# Patient Record
(demographics unavailable — no encounter records)

---

## 2017-07-28 NOTE — RADRPT
PROCEDURE:   XR Chest. 

 

CLINICAL INDICATION:   Abdominal pain.

 

TECHNIQUE:   Single AP portable chest.

 

COMPARISON:   None. 

 

FINDINGS:

 

The cardiac silhouette is mildly enlarged.   The lungs are clear without  pleural effusion or focal 
consolidation. No pneumothorax. The osseous structures and soft tissues are unremarkable. 

 

IMPRESSION:

 

1. No evidence for active cardiopulmonary disease.

 

RPTAT:AAJJ

_____________________________________________ 

MARCIANO Cannon Physician           Date    Time 

Electronically viewed and signed by MARCIANO Cannon Physician on 07/28/2017 01:26 

 

D:  07/28/2017 01:26  T:  07/28/2017 01:26

DOROTA/

## 2017-07-28 NOTE — ERD
ER Documentation


Chief Complaint


Date/Time


DATE: 7/28/17 


TIME: 02:05


Chief Complaint


CWP x 1 week, radiating to left shoulder and back; pt not in distress





HPI


20-year-old man complains of sharp left-sided chest pain radiating to the left 

shoulder and back similar to multiple previous episodes of pericarditis.  He 

states he has had 4 previous pericarditis exacerbations over the last couple of 

years.  He states he uses ibuprofen daily.  He states the pain is nonexertional

, sharp, not associated with shortness of breath, dizziness, diaphoresis, or 

dizziness.  He denies trauma, no fevers or chills, no cough, no headache or 

blurry vision, no calf or leg swelling.





ROS


All systems reviewed and are negative except as per history of present illness.





Medications


Home Meds


Active Scripts


Tramadol HCl (Tramadol HCl) 50 Mg Tablet, 50 MG PO TID for PAIN AND/OR 

INFLAMMATION, #15 TAB


   Prov:MATY DEE MD         7/28/17


Ibuprofen* (Ibuprofen*) 600 Mg Tablet, 600 MG PO Q8 for PAIN AND/OR INFLAMMATION

, #30 TAB


   Prov:MATY DEE MD         7/28/17





Allergies


Allergies:  


Uncoded Allergies:  


     LOBSTER (Allergy, Mild, 7/28/17)





PMhx/Soc


Pericarditis


Medical and Surgical Hx:  pt denies Surgical Hx


Hx Miscellaneous Medical Probl:  Yes (Peracarditis )


Hx Alcohol Use:  No


Hx Substance Use:  No


Hx Tobacco Use:  No


Smoking Status:  Never smoker





FmHx


Family History:  diabetes





Physical Exam


Vitals





Vital Signs








  Date Time  Temp Pulse Resp B/P Pulse Ox O2 Delivery O2 Flow Rate FiO2


 


7/28/17 03:07 98.6 89 14 112/69 100 Room Air  


 


7/28/17 02:30 98.3 75 18 120/68 100 Room Air  


 


7/28/17 00:22 98.0 81 20 132/75 100   








Physical Exam


GENERAL: Well-developed, well-nourished, well-hydrated, in no apparent distress

, looks nontoxic in appearance


HEENT: Moist mucous membranes, pink conjunctiva, no cervical spine tenderness 

or step-off deformities, no goiter, no jaundice or icterus, extraocular 

movements intact without pain. No submandibular induration, and no pharyngeal 

erythema


NEURO: Alert and oriented 3, cranial nerves II through XII intact bilaterally, 

pupils equal round reactive to light, no focal deficits or facial asymmetry, 

sensation intact distally Strength 5/5 in upper and lower extremities 

bilaterally


CARDIAC: Regular rate and rhythm, no murmurs rubs or gallops


LUNGS: Clear bilaterally no wheezing crackles or stridor


ABDOMEN: Soft nontender, no guarding, no rigidity, no rebound, no psoas sign no 

obturator sign. Normoactive bowel sounds


SKIN: Warm and dry to touch, no abrasions, contusions, or hematomas, no 

lacerations, no ecchymosis, no target lesions, and without ulcers


EXTREMITIES: No clubbing cyanosis or edema, calves are bilaterally symmetrical, 

no Homans sign, no popliteal cord sign. Distal pulses equal and bilateral


PSYCH: Normal affect without agitation or irritability


Result Diagram:  


7/28/17 0125 7/28/17 0125





Results 24 hrs





 Laboratory Tests








Test


  7/28/17


01:25


 


White Blood Count 7.010^3/ul 


 


Red Blood Count 4.9810^6/ul 


 


Hemoglobin 13.6g/dl 


 


Hematocrit 40.7% 


 


Mean Corpuscular Volume 81.7fl 


 


Mean Corpuscular Hemoglobin 27.3pg 


 


Mean Corpuscular Hemoglobin


Concent 33.4g/dl 


 


 


Red Cell Distribution Width 12.6% 


 


Platelet Count 88795^3/UL 


 


Mean Platelet Volume 9.3fl 


 


Neutrophils % 56.9% 


 


Lymphocytes % 34.2% 


 


Monocytes % 6.6% 


 


Eosinophils % 1.1% 


 


Basophils % 0.9% 


 


Nucleated Red Blood Cells % 0.0/100WBC 


 


Neutrophils # 4.010^3/ul 


 


Lymphocytes # 2.410^3/ul 


 


Monocytes # 0.510^3/ul 


 


Eosinophils # 0.110^3/ul 


 


Basophils # 0.110^3/ul 


 


Nucleated Red Blood Cells # 0.010^3/ul 


 


Sodium Level 142mmol/L 


 


Potassium Level 3.8mmol/L 


 


Chloride Level 102mmol/L 


 


Carbon Dioxide Level 24mmol/L 


 


Anion Gap 20 


 


Blood Urea Nitrogen 17mg/dl 


 


Creatinine 0.86mg/dl 


 


Glucose Level 100mg/dl 


 


Calcium Level 9.4mg/dl 


 


Total Bilirubin 0.3mg/dl 


 


Direct Bilirubin 0.00mg/dl 


 


Indirect Bilirubin 0.3mg/dl 


 


Aspartate Amino Transf


(AST/SGOT) 27IU/L 


 


 


Alanine Aminotransferase


(ALT/SGPT) 39IU/L 


 


 


Alkaline Phosphatase 104IU/L 


 


Troponin I < 0.012ng/ml 


 


Total Protein 7.7g/dl 


 


Albumin 4.5g/dl 


 


Globulin 3.20g/dl 


 


Albumin/Globulin Ratio 1.40 


 


Lipase 24U/L 








 Current Medications








 Medications


  (Trade)  Dose


 Ordered  Sig/Soy


 Route


 PRN Reason  Start Time


 Stop Time Status Last Admin


Dose Admin


 


 Oxycodone/


 Acetaminophen


  (Percocet (5/


 325))  1 tab  ONCE  ONCE


 PO


   7/28/17 01:30


 7/28/17 01:31 DC 7/28/17 01:14


 











Procedures/MDM


I administered Percocet 1 tablet p.o. for complaints of pain as patient had 

already used ibuprofen without relief.





EKG performed, read by me revealed a normal sinus rhythm at 75 bpm, normal axis

, narrow QRS complex, with slight DC depressions in all leads consistent with 

chronic pericarditis, no concerning ST elevations or depressions noted.





Chest X-ray 1V Interpreted by me:


Soft Tissue:                                               No acute 

abnormalities


Bones:                                                    No acute abnormalities


Mediastinum/Cardiac Silhouette/Lungs:     No acute abnormalities





CBC and electrolytes are normal, liver function tests are normal, troponin was 

negative.





Patient symptoms resolved and he appears healthy.  He can be managed as an 

outpatient I recommended he continue NSAIDs as prescribed.





Differential diagnoses considered, included but not limited to acute coronary 

syndrome, pulmonary embolism, aortic dissection, abdominal aortic aneurysm, 

sepsis, stroke, meningitis, encephalitis, pneumonia, appendicitis, cholecystitis

, bowel obstruction, pyelonephritis, nephrolithiasis, cystitis, as well as 

metabolic, hematologic, and electrolyte abnormalities. As well as abscess, 

cellulitis, fractures, and dislocations.


Patient feels much better at this time, and vital signs are normal, symptoms 

have improved. I did give strict instructions to return to the ED if symptoms 

continue or worsen, patient will otherwise follow-up with primary care 

physician. Patient understood instructions and agreed to plan.





Disclaimer: Inadvertent spelling and grammatical errors are likely due to EHR/

dictation software use and do not reflect on the overall quality of patient 

care. Also, please note that the electronic time recorded on this note does not 

necessarily reflect the actual time of the patient encounter.





Departure


Diagnosis:  


 Primary Impression:  


 Chest pain


 Chest pain type:  unspecified  Qualified Code:  R07.9 - Chest pain, 

unspecified type


 Additional Impression:  


 Pericarditis


 Pericarditis type:  unspecified type  Chronicity:  acute  Qualified Code:  

I30.9 - Acute pericarditis, unspecified type


Condition:  MATY So MD Jul 28, 2017 02:09